# Patient Record
Sex: FEMALE | Race: WHITE
[De-identification: names, ages, dates, MRNs, and addresses within clinical notes are randomized per-mention and may not be internally consistent; named-entity substitution may affect disease eponyms.]

---

## 2017-01-26 ENCOUNTER — HOSPITAL ENCOUNTER (OUTPATIENT)
Dept: HOSPITAL 80 - FIMAGING | Age: 77
End: 2017-01-26
Attending: PHYSICIAN ASSISTANT
Payer: COMMERCIAL

## 2017-01-26 DIAGNOSIS — M48.04: ICD-10-CM

## 2017-01-26 DIAGNOSIS — Z01.812: ICD-10-CM

## 2017-01-26 DIAGNOSIS — M46.44: Primary | ICD-10-CM

## 2017-01-26 DIAGNOSIS — G95.9: ICD-10-CM

## 2017-01-26 DIAGNOSIS — M46.24: ICD-10-CM

## 2017-01-26 DIAGNOSIS — Z85.3: ICD-10-CM

## 2017-01-26 PROCEDURE — 72157 MRI CHEST SPINE W/O & W/DYE: CPT

## 2017-01-26 PROCEDURE — A9585 GADOBUTROL INJECTION: HCPCS

## 2017-01-26 NOTE — MR
MRI Thoracic Spine, Without and With Contrast  



History:  T4-T5 diskitis.  T7 intramedullary cord lesion.  Follow up.  Previous breast cancer.  M46.4
4.



Technique:  Sagittal T1/T2/STIR and axial T1/T2-weighted and postcontrast sagittal and axial t1-weigh
balta MR series of the thoracic spine prior to and following the uneventful intravenous administration 
of 7 mL of Gadavist contrast.



Comparison:  Multiple prior MRIs from 2016.  Most recent MRI October 2016.



Findings



T4-T5:  Severe disk space narrowing, with complete loss of disk height, endplate enhancement, and adj
acent vertebral body enhancement of T4 and T5 vertebral bodies, appearing similar to the October 2016
 study although the disk fluid at T4-T5 appears less impressive suggesting continued slight improveme
nt of diskitis and osteomyelitis.  No evidence of epidural abscess.  Dorsal disk/osteophyte complex a
t T4-T5 results in moderate central canal stenosis, with slight cord compression and deformity and pa
rtial effacement of the ventral subarachnoid space.  No evidence of cord edema.  No new foci of diski
tis or osteomyelitis.



Intramedullary enhancing homogeneous core lesion, with slight cord expansion, at the T7-T8 disk space
 level, measuring approximately 7 x 5 mm, appears stable in size to previous studies.  No new enhanci
ng cord lesions.



C5-C6:  Severe degenerative disk disease, with dorsal disk/osteophyte complex, resulting in severe ce
ntral canal stenosis, with cord compression and deformity, and severe bilateral neural foraminal sten
osis on the sagittal series.  No adnexal imaging through this region.



C6-C7 and C7-T1:  Severe degenerative disk disease, with dorsal disk/osteophyte complexes, resulting 
in mild to moderate central canal stenosis and moderate to severe bilateral neural foraminal stenosis
, worse at C6-C7.



T1-T2:  Severe degenerative disk disease and degenerative anterolisthesis, with dorsal disk/osteophyt
e complex and bilateral facet arthropathy, resulting in moderate central canal stenosis, with slight 
cord compression and deformity and moderate to severe bilateral neural foraminal stenosis.



T2-T3:  Mild degenerative disk disease, without stenosis.



T3-T4:  Mild degenerative disk disease and mild bilateral facet arthropathy resulting in mild central
 canal stenosis and mild bilateral neural foraminal stenosis.



T5-T6:  No disk herniation or stenosis.



T6-T7:  No disk herniation or stenosis.



T7-T8:  No disk herniation or stenosis.



T8-T9:  No disk herniation or stenosis.



T9-T10:  Mild bilateral facet arthropathy, without stenosis.



T10-T11:  Moderate bilateral facet arthropathy and mild degenerative disk disease, with mild disk bul
ge, resulting in mild central canal stenosis and mild to moderate bilateral neural foraminal stenosis
.



T11-T12:  Moderate bilateral facet arthropathy resulting in mild to moderate bilateral neural foramin
al stenosis, without central canal stenosis.



T12-L1:  Severe degenerative disk disease, with dorsal disk/osteophyte complex and severe bilateral f
acet arthropathy, resulting in mild to moderate central canal stenosis and moderate to severe bilater
al neural foraminal stenosis.



Impressions

1.  Minimal improvement in T4-T5 diskitis and osteomyelitis since October 2016, with residual severe 
degenerative disk disease, kyphosis, dorsal disk/osteophyte complex, and enhancing vertebral bodies, 
resulting in moderate central canal stenosis, with slight cord compression.

2.  No new foci of diskitis or osteomyelitis.  No epidural abscess.

3.  Nonspecific intramedullary enhancing cord lesion at the T7-T8 level, with a differential diagnosi
s of metastasis, astrocytoma, or ependymoma.

4.  Please see above findings.



E:amm

## 2017-08-07 ENCOUNTER — HOSPITAL ENCOUNTER (OUTPATIENT)
Dept: HOSPITAL 80 - FIMAGING | Age: 77
End: 2017-08-07
Attending: PHYSICIAN ASSISTANT
Payer: COMMERCIAL

## 2017-08-07 DIAGNOSIS — M51.34: ICD-10-CM

## 2017-08-07 DIAGNOSIS — M46.44: Primary | ICD-10-CM

## 2017-08-07 DIAGNOSIS — S24.159A: ICD-10-CM

## 2017-08-07 PROCEDURE — A9585 GADOBUTROL INJECTION: HCPCS

## 2017-08-07 PROCEDURE — 72157 MRI CHEST SPINE W/O & W/DYE: CPT

## 2017-11-02 ENCOUNTER — HOSPITAL ENCOUNTER (OUTPATIENT)
Dept: HOSPITAL 80 - FIMAGING | Age: 77
End: 2017-11-02
Attending: INTERNAL MEDICINE
Payer: COMMERCIAL

## 2017-11-02 DIAGNOSIS — Z12.31: Primary | ICD-10-CM

## 2017-11-02 DIAGNOSIS — Z92.3: ICD-10-CM

## 2017-11-02 DIAGNOSIS — Z85.3: ICD-10-CM

## 2017-11-02 PROCEDURE — G0202 SCR MAMMO BI INCL CAD: HCPCS

## 2018-01-31 ENCOUNTER — HOSPITAL ENCOUNTER (OUTPATIENT)
Dept: HOSPITAL 80 - FIMAGING | Age: 78
End: 2018-01-31
Attending: PHYSICIAN ASSISTANT
Payer: COMMERCIAL

## 2018-01-31 DIAGNOSIS — Z85.3: ICD-10-CM

## 2018-01-31 DIAGNOSIS — M51.34: ICD-10-CM

## 2018-01-31 DIAGNOSIS — G95.9: Primary | ICD-10-CM

## 2018-01-31 DIAGNOSIS — M47.895: ICD-10-CM

## 2018-01-31 PROCEDURE — 72157 MRI CHEST SPINE W/O & W/DYE: CPT

## 2018-01-31 PROCEDURE — A9585 GADOBUTROL INJECTION: HCPCS

## 2018-09-01 ENCOUNTER — HOSPITAL ENCOUNTER (EMERGENCY)
Dept: HOSPITAL 80 - FED | Age: 78
Discharge: HOME | End: 2018-09-01
Payer: COMMERCIAL

## 2018-09-01 VITALS — SYSTOLIC BLOOD PRESSURE: 154 MMHG | DIASTOLIC BLOOD PRESSURE: 72 MMHG
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
## 2018-09-01 DIAGNOSIS — S01.81XA: Primary | ICD-10-CM

## 2018-09-01 PROCEDURE — 0HQ1XZZ REPAIR FACE SKIN, EXTERNAL APPROACH: ICD-10-PCS

## 2018-09-01 NOTE — EDPHY
H & P


Stated Complaint: laceration of forehead r/t fell today


Time Seen by Provider: 09/01/18 22:26


HPI/ROS: 





CHIEF COMPLAINT:  Forehead laceration





HISTORY OF PRESENT ILLNESS:  78 year old female no anticoagulant use states 

that approximately 3:00 p.m. Today when she was walking across the street in 

[facility] she tripped and fell impacting her right frontal region 

sustaining a circular linear laceration to her right frontal region.  No loss 

of consciousness.  No headache no nausea or vomiting.  She did not want seek 

medical attention [place] and waited until she returned to [place].  She came 

directly from the airport to the hospital.








REVIEW OF SYSTEMS:


10 systems reviewed and negative with the exception of the elements mentioned 

in the history of present illness








PAST MEDICAL/SURGICAL HISTORY: no anticoagulant use, no relevant medical/

surgical history





SOCIAL HISTORY: denies alcohol use at time of incident





*********





PHYSICAL EXAM 





1) GENERAL: Well-developed, well-nourished, alert and oriented.  Appears to be 

in no acute distress. Answering questions appropriately.


2) HEAD: Normocephalic, right frontal 1.5 cm linear laceration with adjacent 

2.5 cm abrasion


3) HEENT: Pupils equal, round, reactive to light bilaterally. Negative Horners. 

Nasopharynx, oropharynx, clear.   No deformity or angulation of nose.  No 

septal hematoma.  No rhinorrhea. No oral trauma. Ears bilaterally with normal 

tympanic membranes.  No hemotympanum.  No fluid or blood in the external 

auditory canal.  No raccoon eyes.  No Acuna sign.  Teeth are normally aligned 

with no gross malocclusion, TMJ bilaterally nontender, facial bones nontender 

including the zygomatic arch, maxilla mandible.


4) NECK:  No cervical collar is on. Posterior cervical spine is nontender, no 

stepoff, no effusion. Full range of motion which does not elicit any midline 

cervical spine pain, no posterior midline tenderness, no step-off.


5) LUNGS: Clear to auscultation bilaterally, no wheezes, no rhonchi, no 

retractions.  No obvious signs of trauma.  No chest wall pain.  No flaring, no 

grunting.  Moving symmetrically.  No crepitus. 


6) HEART: [Regular rate and rhythm, 


7) ABDOMEN: No guarding, no rebound, no focal tenderness, no peritoneal signs, 

no signs of trauma, no ecchymosis


8) MUSCULOSKELETAL: Moving all extremities, no focal areas of tenderness, no 

obvious trauma.


9) BACK:  No midline vertebral tenderness, no fluctuance, no step-off, no 

obvious trauma, no visual or palpable abnormality.


10) SKIN:  Forehead laceration


11) NEURO: Awake, alert, and oriented to person, place and time.  Answers 

questions appropriately.  There were no obvious focal neurologic abnormalities.

  No cerebellar dysfunction.  Cranial nerves 2 through to 12 intact.  Normal 

steady gait.  Upper and lower extremities bilaterally with strength 5 / 5, 

reflexes 2+.


***********





DIFFERENTIAL DIAGNOSIS:  Not necessarily in any particular order, my 

differential diagnosis includes, but is not limited to, concussion, skull 

fracture, intraparenchymal contusion, subarachnoid, subdural and epidural 

hematoma.  The patient understands that this diagnosis is provisional and can 

never be 100% accurate. 





- Personal History


Current Tetanus Diphtheria and Acellular Pertussis (TDAP): Yes


Tetanus Vaccine Date: < 10 years





- Medical/Surgical History


Hx Asthma: No


Hx Chronic Respiratory Disease: No


Hx Diabetes: No


Hx Cardiac Disease: No


Hx Renal Disease: No


Hx Cirrhosis: No


Hx Alcoholism: No


Hx HIV/AIDS: No


Hx Splenectomy or Spleen Trauma: No


Other PMH: Breast CA, Bladder CA





- Social History


Smoking Status: Never smoked


Constitutional: 


 Initial Vital Signs











Temperature (C)  37.1 C   09/01/18 22:19


 


Heart Rate  84   09/01/18 22:19


 


Respiratory Rate  18   09/01/18 22:19


 


Blood Pressure  158/73 H  09/01/18 22:19


 


O2 Sat (%)  94   09/01/18 22:19








 











O2 Delivery Mode               Room Air














Allergies/Adverse Reactions: 


 





No Known Allergies Allergy (Verified 09/05/13 11:40)


 








Home Medications: 














 Medication  Instructions  Recorded


 


Aspirin [Aspirin 81mg (*)] 81 mg PO DAILY 08/19/16


 


Atorvastatin Calcium [Lipitor 40 40 mg PO DAILY 08/19/16





mg (*)]  


 


Cholecalciferol Vit D3 [Vitamin D3 1,000 units PO DAILY 08/19/16





(*)]  


 


Glucosamine/Chondroitin 1 each PO DAILY 08/19/16





[Glucosamine/Chondroitin (*)]  


 


Herbals/Supplements -Info Only 1 ea PO DAILY 08/19/16


 


Levothyroxine [Synthroid 137 mcg 137 mcg PO DAILY06 08/19/16





(*)]  


 


Lisinopril/Hydrochlorothiazide 1 each PO DAILY 08/19/16





[Zestoretic 20-25 mg Tablet]  


 


Naproxen Sodium [Aleve 220 MG (*)] 220 - 440 mg PO DAILY PRN 08/19/16


 


Omega-3 Fatty Acids [Fish Oil 1000 1,000 mg PO DAILY 08/19/16





mg (*)]  


 


Acetaminophen [Tylenol 325mg (*)] 650 mg PO Q4 PRN #0 tab 08/22/16


 


Alteplase [Cathflo Activase 2 mg 2 mg IVP PRN PRN #0 vial 08/22/16





(*)]  


 


Cefepime HCl [Maxipime] 2 gm IV Q12 #0 vial 08/22/16


 


Docusate Sodium [Colace 100 MG (*)] 100 mg PO BID #0 cap 08/22/16


 


Sennosides [Senokot] 2 tab PO DAILY #0 tab 08/22/16


 


Vancomycin HCl/Normal Saline 250 ml IV Q12H #0 bag 08/22/16





[Vancomycin 1 gm (Premix)]  


 


oxyCODONE IR [Oxycodone Ir (*)] 10 mg PO TID PRN #30 tab 08/22/16














Medical Decision Making





- Diagnostics


Imaging Results: 


CT imaging negative for posttraumatic sequelae and interpreted by staff 

radiologist with images reviewed myself


Procedures: 





Procedure:  Laceration repair.


 


I explained the indications, risks and benefits for both laceration repair and 

anesthetic administration. Verbal consent was obtained from the patient .   The 

laceration on the forehead was anesthetized using 0.5% bupivicaine with 

epinephrine . After anesthetic administered the patient was observed for a 

period of time and had no apparent adverse effects. The wound was cleaned, 

prepped, draped in normal sterile fashion and explored to its base.  No foreign 

body seen, no foreign bodies palpated. There were no deep structures involved. 

   The wound was repaired with tissue adhesive.  The wound repair was simple.  

The procedure was performed by myself. Patient has been informed that scarring 

will occur, although efforts have been made to minimize this.


ED Course/Re-evaluation: 





1040 pm:  Head CT ordered in this patient for trauma for the following 

indication:  Greater than 65 years old.





Patient was re-evaluated with serial examinations.  Discussed her negative 

imaging results.  Primary wound closure in the ER.  Answering questions 

appropriately.  I Think she can be discharged.  Usual and customary head injury 

precautions and instructions provided.  All questions and concerns addressed by 

myself.  I saw this patient independently based on established practice 

protocols.  Care of patient under supervision of  secondary supervising 

physician Dr Durbin .





Departure





- Departure


Disposition: Home, Routine, Self-Care


Clinical Impression: 


Head injury due to trauma


Qualifiers:


 Encounter type: initial encounter Qualified Code(s): S09.90XA - Unspecified 

injury of head, initial encounter





Laceration of forehead


Qualifiers:


 Encounter type: initial encounter Qualified Code(s): S01.81XA - Laceration 

without foreign body of other part of head, initial encounter





Condition: Good


Instructions:  Laceration (ED), Skin Adhesive Care (ED)


Additional Instructions: 


ALTHOUGH THERE IS NO EVIDENCE OF SERIOUS HEAD INJURY AT THIS TIME, DELAYED 

SIGNS CAN APPEAR 24 TO 48 HOURS AFTER INJURY.  PLEASE RETURN TO THE EMERGENCY 

DEPARTMENT (ED) IMMEDIATELY IF YOU HAVE INCREASED HEADACHE, PERSISTENT HEADACHE

, VOMITING, WEAKNESS, CONFUSION OR VISUAL PROBLEMS.  WE RECOMMEND THAT YOU DO 

NOT RESUME CONTACT SPORTS OR ACTIVITIES THAT TAKE COORDINATION OR BALANCE SUCH 

AS SKIING OR RIDING A BICYCLE UNTIL CLEARED TO DO SO BY YOUR DOCTOR OR BY A 

NEUROLOGIST.


Referrals: 


Dolly Bernal MD [Primary Care Provider] - 1-2 days without fail

## 2018-11-05 ENCOUNTER — HOSPITAL ENCOUNTER (OUTPATIENT)
Dept: HOSPITAL 80 - FIMAGING | Age: 78
End: 2018-11-05
Attending: INTERNAL MEDICINE
Payer: COMMERCIAL

## 2018-11-05 DIAGNOSIS — Z12.31: Primary | ICD-10-CM

## 2018-11-05 DIAGNOSIS — Z85.3: ICD-10-CM

## 2018-11-05 DIAGNOSIS — Z80.3: ICD-10-CM

## 2019-04-01 ENCOUNTER — HOSPITAL ENCOUNTER (OUTPATIENT)
Dept: HOSPITAL 80 - FIMAGING | Age: 79
End: 2019-04-01
Attending: PHYSICIAN ASSISTANT
Payer: COMMERCIAL

## 2019-04-01 DIAGNOSIS — S24.153A: Primary | ICD-10-CM

## 2019-04-01 DIAGNOSIS — M50.33: ICD-10-CM

## 2019-04-01 DIAGNOSIS — S22.040A: ICD-10-CM

## 2019-04-01 DIAGNOSIS — M48.05: ICD-10-CM

## 2019-04-01 PROCEDURE — A9585 GADOBUTROL INJECTION: HCPCS

## 2019-04-01 PROCEDURE — 72157 MRI CHEST SPINE W/O & W/DYE: CPT
